# Patient Record
Sex: MALE | Race: WHITE
[De-identification: names, ages, dates, MRNs, and addresses within clinical notes are randomized per-mention and may not be internally consistent; named-entity substitution may affect disease eponyms.]

---

## 2021-03-13 ENCOUNTER — HOSPITAL ENCOUNTER (INPATIENT)
Dept: HOSPITAL 54 - ER | Age: 36
LOS: 2 days | Discharge: HOME | DRG: 53 | End: 2021-03-15
Attending: FAMILY MEDICINE | Admitting: FAMILY MEDICINE
Payer: MEDICAID

## 2021-03-13 VITALS — SYSTOLIC BLOOD PRESSURE: 130 MMHG | DIASTOLIC BLOOD PRESSURE: 56 MMHG

## 2021-03-13 VITALS — SYSTOLIC BLOOD PRESSURE: 102 MMHG | DIASTOLIC BLOOD PRESSURE: 60 MMHG

## 2021-03-13 VITALS — BODY MASS INDEX: 26.98 KG/M2 | WEIGHT: 178 LBS | HEIGHT: 68 IN

## 2021-03-13 DIAGNOSIS — G40.509: Primary | ICD-10-CM

## 2021-03-13 DIAGNOSIS — E80.6: ICD-10-CM

## 2021-03-13 DIAGNOSIS — E87.1: ICD-10-CM

## 2021-03-13 DIAGNOSIS — K59.00: ICD-10-CM

## 2021-03-13 DIAGNOSIS — L40.9: ICD-10-CM

## 2021-03-13 DIAGNOSIS — E83.42: ICD-10-CM

## 2021-03-13 DIAGNOSIS — D53.9: ICD-10-CM

## 2021-03-13 DIAGNOSIS — Z88.5: ICD-10-CM
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
## 2021-03-13 DIAGNOSIS — E83.39: ICD-10-CM

## 2021-03-13 DIAGNOSIS — N39.0: ICD-10-CM

## 2021-03-13 DIAGNOSIS — K76.0: ICD-10-CM

## 2021-03-13 DIAGNOSIS — E87.6: ICD-10-CM

## 2021-03-13 DIAGNOSIS — F10.231: ICD-10-CM

## 2021-03-13 DIAGNOSIS — F10.221: ICD-10-CM

## 2021-03-13 DIAGNOSIS — E44.0: ICD-10-CM

## 2021-03-13 LAB
ALBUMIN SERPL BCP-MCNC: 2.6 G/DL (ref 3.4–5)
ALP SERPL-CCNC: 518 U/L (ref 46–116)
ALT SERPL W P-5'-P-CCNC: 60 U/L (ref 12–78)
APAP SERPL-MCNC: < 2 UG/ML (ref 10–30)
AST SERPL W P-5'-P-CCNC: 292 U/L (ref 15–37)
BASOPHILS # BLD AUTO: 0.1 /CMM (ref 0–0.2)
BASOPHILS NFR BLD AUTO: 0.6 % (ref 0–2)
BILIRUB DIRECT SERPL-MCNC: 12.3 MG/DL (ref 0–0.2)
BILIRUB SERPL-MCNC: 15.2 MG/DL (ref 0.2–1)
BILIRUB UR QL STRIP: (no result)
BUN SERPL-MCNC: 10 MG/DL (ref 7–18)
CALCIUM SERPL-MCNC: 7.9 MG/DL (ref 8.5–10.1)
CHLORIDE SERPL-SCNC: 96 MMOL/L (ref 98–107)
CO2 SERPL-SCNC: 29 MMOL/L (ref 21–32)
COLOR UR: (no result)
CREAT SERPL-MCNC: 0.9 MG/DL (ref 0.6–1.3)
EOSINOPHIL NFR BLD AUTO: 0.4 % (ref 0–6)
ETHANOL SERPL-MCNC: < 3 MG/DL (ref 0–0)
GLUCOSE SERPL-MCNC: 111 MG/DL (ref 74–106)
GLUCOSE UR STRIP-MCNC: (no result) MG/DL
HCT VFR BLD AUTO: 33 % (ref 39–51)
HGB BLD-MCNC: 11.2 G/DL (ref 13.5–17.5)
LYMPHOCYTES NFR BLD AUTO: 1.5 /CMM (ref 0.8–4.8)
LYMPHOCYTES NFR BLD AUTO: 16.9 % (ref 20–44)
LYMPHOCYTES NFR BLD MANUAL: 10 % (ref 16–48)
MCHC RBC AUTO-ENTMCNC: 34 G/DL (ref 31–36)
MCV RBC AUTO: 106 FL (ref 80–96)
MONOCYTES NFR BLD AUTO: 0.9 /CMM (ref 0.1–1.3)
MONOCYTES NFR BLD AUTO: 10.1 % (ref 2–12)
MONOCYTES NFR BLD MANUAL: 10 % (ref 0–11)
NEUTROPHILS # BLD AUTO: 6.5 /CMM (ref 1.8–8.9)
NEUTROPHILS NFR BLD AUTO: 72 % (ref 43–81)
NEUTS BAND NFR BLD MANUAL: 2 % (ref 0–5)
NEUTS SEG NFR BLD MANUAL: 78 % (ref 42–76)
PH UR STRIP: 5.5 [PH] (ref 5–8)
PLATELET # BLD AUTO: 114 /CMM (ref 150–450)
POTASSIUM SERPL-SCNC: 2.6 MMOL/L (ref 3.5–5.1)
PROT SERPL-MCNC: 7.8 G/DL (ref 6.4–8.2)
PROT UR QL STRIP: >=300 MG/DL
RBC # BLD AUTO: 3.12 MIL/UL (ref 4.5–6)
RBC #/AREA URNS HPF: (no result) /HPF (ref 0–2)
SODIUM SERPL-SCNC: 134 MMOL/L (ref 136–145)
UROBILINOGEN UR STRIP-MCNC: 2 EU/DL
WBC #/AREA URNS HPF: (no result) /HPF
WBC #/AREA URNS HPF: (no result) /HPF (ref 0–3)
WBC NRBC COR # BLD AUTO: 9 K/UL (ref 4.3–11)

## 2021-03-13 PROCEDURE — G0378 HOSPITAL OBSERVATION PER HR: HCPCS

## 2021-03-13 PROCEDURE — G0480 DRUG TEST DEF 1-7 CLASSES: HCPCS

## 2021-03-13 RX ADMIN — DEXTROSE MONOHYDRATE SCH MLS/HR: 50 INJECTION, SOLUTION INTRAVENOUS at 15:38

## 2021-03-13 RX ADMIN — POTASSIUM CHLORIDE SCH MLS/HR: 200 INJECTION, SOLUTION INTRAVENOUS at 17:09

## 2021-03-13 RX ADMIN — SODIUM CHLORIDE SCH MLS/HR: 9 INJECTION, SOLUTION INTRAVENOUS at 22:52

## 2021-03-13 RX ADMIN — POTASSIUM CHLORIDE SCH MLS/HR: 200 INJECTION, SOLUTION INTRAVENOUS at 20:47

## 2021-03-13 RX ADMIN — POTASSIUM CHLORIDE SCH MLS/HR: 200 INJECTION, SOLUTION INTRAVENOUS at 22:51

## 2021-03-13 RX ADMIN — POTASSIUM CHLORIDE SCH MLS/HR: 200 INJECTION, SOLUTION INTRAVENOUS at 15:10

## 2021-03-13 RX ADMIN — SODIUM CHLORIDE SCH MLS/HR: 9 INJECTION, SOLUTION INTRAVENOUS at 13:43

## 2021-03-13 RX ADMIN — POTASSIUM CHLORIDE SCH MLS/HR: 200 INJECTION, SOLUTION INTRAVENOUS at 18:10

## 2021-03-13 RX ADMIN — SODIUM CHLORIDE SCH MLS/HR: 9 INJECTION, SOLUTION INTRAVENOUS at 23:00

## 2021-03-13 RX ADMIN — SODIUM CHLORIDE SCH MLS/HR: 9 INJECTION, SOLUTION INTRAVENOUS at 18:57

## 2021-03-13 NOTE — NUR
pt  rec;td to er  c/o etoh  withdrawl. pt  stated had  seizure  3/12/21  hasn't 
 drank since  thurday  has not had a bm in one  week.   eyes and  forehead for 
yellow.  ua sen to lab  darkbrownish. cloudy.  labs  dranwn  sent to lab 
AWAITING EVALUATION BY ER PROVIDER.

## 2021-03-13 NOTE — NUR
PATIENT RECEIVED FROM ER AT 1430. REPORT RECEIVED FROM NURSE. 



PATIENT IN BED RESTING IN SEMI-FOWLERS POSITION. PATIENT ON ROOM AIR WITH NO COMPLAINTS OF 
SOB OR SIGNS OF RESPIRATORY DISTRESS NOTED. RIGHT AC 18 G PATENT AND INTACT. SAFETY 
PRECAUTIONS IMPLEMENTED, BED LOCKED IN LOWEST POSITION, SIDE RAILS UP X2, SEIZURE 
PRECAUTIONS IN PLACE AND SIDE RAILS PADDED, CALL LIGHT WITHIN REACH. WILL ENDORSE 
CONTINUATION OF CARE TO UPCOMING SHIFT.

## 2021-03-13 NOTE — NUR
RN NOTE



RECEIVED PATIENT IN BED, ON SEMI MOSES'S, IN NO S/SX OF ACUTE DISTRESS AT THIS TIME. NO SOB 
NOTED. PATIENT'S BREATHING IS EVEN AND UNLABORED. SATURATION AT 99% ON ROOM AIR, ST ON THE 
MONITOR, HR . NOTED IV SITE AT RIGHT ANTECUBITAL 18 GAUGE, PATENT AND FLUSHING WELL, 
NO S/S OF INFECTION OR INFILTRATION WITH ONGOING K REPLACEMENT AT 50 ML/HR BAG 3 OF 5, AND 
MULTIVITAMINS IN NS 1L  ML/HR. PATIENT IS AMBULATORY WITH ASSIST, URINAL AT BEDSIDE. 
SEIZURE PRECAUTIONS MAINTAINED. SAFETY MEASURES IMPLEMENTED. PATIENT BED ALARM IS ON. HEAD 
OF BED ELEVATED. BED IS LOCKED,  IN LOWEST POSITION AND SIDE RAILS UP. CALL LIGHT WITHIN 
REACH OF THE PATIENT. WILL CONTINUE TO MONITOR AND REASSESS FOR ANY CHANGES.

## 2021-03-14 VITALS — SYSTOLIC BLOOD PRESSURE: 115 MMHG | DIASTOLIC BLOOD PRESSURE: 77 MMHG

## 2021-03-14 VITALS — SYSTOLIC BLOOD PRESSURE: 110 MMHG | DIASTOLIC BLOOD PRESSURE: 64 MMHG

## 2021-03-14 VITALS — SYSTOLIC BLOOD PRESSURE: 103 MMHG | DIASTOLIC BLOOD PRESSURE: 63 MMHG

## 2021-03-14 VITALS — SYSTOLIC BLOOD PRESSURE: 109 MMHG | DIASTOLIC BLOOD PRESSURE: 64 MMHG

## 2021-03-14 VITALS — SYSTOLIC BLOOD PRESSURE: 101 MMHG | DIASTOLIC BLOOD PRESSURE: 35 MMHG

## 2021-03-14 LAB
BASOPHILS # BLD AUTO: 0 /CMM (ref 0–0.2)
BASOPHILS NFR BLD AUTO: 0.6 % (ref 0–2)
BUN SERPL-MCNC: 9 MG/DL (ref 7–18)
CALCIUM SERPL-MCNC: 7.2 MG/DL (ref 8.5–10.1)
CHLORIDE SERPL-SCNC: 102 MMOL/L (ref 98–107)
CHOLEST SERPL-MCNC: 163 MG/DL (ref ?–200)
CO2 SERPL-SCNC: 26 MMOL/L (ref 21–32)
CREAT SERPL-MCNC: 0.6 MG/DL (ref 0.6–1.3)
EOSINOPHIL NFR BLD AUTO: 0.9 % (ref 0–6)
EOSINOPHIL NFR BLD MANUAL: 1 % (ref 0–4)
GLUCOSE SERPL-MCNC: 74 MG/DL (ref 74–106)
HCT VFR BLD AUTO: 29 % (ref 39–51)
HDLC SERPL-MCNC: < 10 MG/DL (ref 40–60)
HGB BLD-MCNC: 9.9 G/DL (ref 13.5–17.5)
LDLC SERPL DIRECT ASSAY-MCNC: 133 MG/DL (ref 0–99)
LYMPHOCYTES NFR BLD AUTO: 2.4 /CMM (ref 0.8–4.8)
LYMPHOCYTES NFR BLD AUTO: 34.7 % (ref 20–44)
LYMPHOCYTES NFR BLD MANUAL: 18 % (ref 16–48)
MAGNESIUM SERPL-MCNC: 1.3 MG/DL (ref 1.8–2.4)
MCHC RBC AUTO-ENTMCNC: 34 G/DL (ref 31–36)
MCV RBC AUTO: 107 FL (ref 80–96)
MONOCYTES NFR BLD AUTO: 0.8 /CMM (ref 0.1–1.3)
MONOCYTES NFR BLD AUTO: 10.9 % (ref 2–12)
MONOCYTES NFR BLD MANUAL: 12 % (ref 0–11)
NEUTROPHILS # BLD AUTO: 3.7 /CMM (ref 1.8–8.9)
NEUTROPHILS NFR BLD AUTO: 52.9 % (ref 43–81)
NEUTS BAND NFR BLD MANUAL: 1 % (ref 0–5)
NEUTS SEG NFR BLD MANUAL: 68 % (ref 42–76)
PHOSPHATE SERPL-MCNC: 1.6 MG/DL (ref 2.5–4.9)
PLATELET # BLD AUTO: 104 /CMM (ref 150–450)
POTASSIUM SERPL-SCNC: 3.4 MMOL/L (ref 3.5–5.1)
RBC # BLD AUTO: 2.69 MIL/UL (ref 4.5–6)
SODIUM SERPL-SCNC: 137 MMOL/L (ref 136–145)
TRIGL SERPL-MCNC: 204 MG/DL (ref 30–150)
WBC NRBC COR # BLD AUTO: 7 K/UL (ref 4.3–11)

## 2021-03-14 RX ADMIN — Medication PRN MG: at 22:34

## 2021-03-14 RX ADMIN — MAGNESIUM SULFATE IN DEXTROSE SCH MLS/HR: 10 INJECTION, SOLUTION INTRAVENOUS at 11:14

## 2021-03-14 RX ADMIN — MAGNESIUM SULFATE IN DEXTROSE SCH MLS/HR: 10 INJECTION, SOLUTION INTRAVENOUS at 13:19

## 2021-03-14 RX ADMIN — MAGNESIUM SULFATE IN DEXTROSE SCH MLS/HR: 10 INJECTION, SOLUTION INTRAVENOUS at 08:58

## 2021-03-14 RX ADMIN — DEXTROSE MONOHYDRATE SCH MLS/HR: 50 INJECTION, SOLUTION INTRAVENOUS at 15:52

## 2021-03-14 RX ADMIN — Medication SCH TAB: at 10:46

## 2021-03-14 RX ADMIN — SODIUM CHLORIDE SCH MLS/HR: 9 INJECTION, SOLUTION INTRAVENOUS at 09:59

## 2021-03-14 RX ADMIN — Medication PRN MG: at 17:15

## 2021-03-14 RX ADMIN — SODIUM CHLORIDE SCH MLS/HR: 9 INJECTION, SOLUTION INTRAVENOUS at 00:06

## 2021-03-14 RX ADMIN — MAGNESIUM SULFATE IN DEXTROSE SCH MLS/HR: 10 INJECTION, SOLUTION INTRAVENOUS at 10:01

## 2021-03-14 NOTE — NUR
RN NOTE



PER EMAR PROTOCOL, ONE BAG OF NS WITH MVI PER DAY, RUN IN PLACE OF NS. CURRENT BAG OF NS 
WITH MVI STARTED AT 1857 BY AM SHIFT. WILL START NS ONCE NS WITH MVI CONSUMED. CHARGE RN 
MADE AWARE.

## 2021-03-14 NOTE — NUR
ms rn note

 patient in bed , alert oriented no sob noted on ra , remove tele monitor will tell md , on 
npo status at this time, bed in lowest and locked position, plan of care dicuissed with 
patint seen by dr powers , rt ac hl intact and flushed well ,on ivf as ordered will 
monitor

## 2021-03-14 NOTE — NUR
MS RN NOTE 

 FEELS A LITTLE BETTER AFTER MYLICON PO GIVEN , CONT ON IVF AS ORDERED BED IN LOWEST AND 
LOCKED POSITION, WILL CONT TO MONITOR

## 2021-03-14 NOTE — NUR
tele rn note

 still c\o abdominal bloating called to dr powers,. gave order simethiconivory jacobn ,order 
carried out

## 2021-03-15 VITALS — SYSTOLIC BLOOD PRESSURE: 119 MMHG | DIASTOLIC BLOOD PRESSURE: 75 MMHG

## 2021-03-15 VITALS — DIASTOLIC BLOOD PRESSURE: 63 MMHG | SYSTOLIC BLOOD PRESSURE: 93 MMHG

## 2021-03-15 LAB
BASOPHILS # BLD AUTO: 0 /CMM (ref 0–0.2)
BASOPHILS NFR BLD AUTO: 0.6 % (ref 0–2)
BUN SERPL-MCNC: 7 MG/DL (ref 7–18)
CALCIUM SERPL-MCNC: 7.1 MG/DL (ref 8.5–10.1)
CHLORIDE SERPL-SCNC: 103 MMOL/L (ref 98–107)
CO2 SERPL-SCNC: 24 MMOL/L (ref 21–32)
CREAT SERPL-MCNC: 0.6 MG/DL (ref 0.6–1.3)
EOSINOPHIL NFR BLD AUTO: 0.7 % (ref 0–6)
GLUCOSE SERPL-MCNC: 102 MG/DL (ref 74–106)
HCT VFR BLD AUTO: 29 % (ref 39–51)
HGB BLD-MCNC: 9.8 G/DL (ref 13.5–17.5)
LYMPHOCYTES NFR BLD AUTO: 2.7 /CMM (ref 0.8–4.8)
LYMPHOCYTES NFR BLD AUTO: 35.9 % (ref 20–44)
LYMPHOCYTES NFR BLD MANUAL: 14 % (ref 16–48)
MAGNESIUM SERPL-MCNC: 1.9 MG/DL (ref 1.8–2.4)
MCHC RBC AUTO-ENTMCNC: 34 G/DL (ref 31–36)
MCV RBC AUTO: 107 FL (ref 80–96)
MONOCYTES NFR BLD AUTO: 1 /CMM (ref 0.1–1.3)
MONOCYTES NFR BLD AUTO: 13.2 % (ref 2–12)
MONOCYTES NFR BLD MANUAL: 8 % (ref 0–11)
NEUTROPHILS # BLD AUTO: 3.7 /CMM (ref 1.8–8.9)
NEUTROPHILS NFR BLD AUTO: 49.6 % (ref 43–81)
NEUTS BAND NFR BLD MANUAL: 4 % (ref 0–5)
NEUTS SEG NFR BLD MANUAL: 74 % (ref 42–76)
PHOSPHATE SERPL-MCNC: 1.8 MG/DL (ref 2.5–4.9)
PLATELET # BLD AUTO: 139 /CMM (ref 150–450)
POTASSIUM SERPL-SCNC: 3.3 MMOL/L (ref 3.5–5.1)
RBC # BLD AUTO: 2.67 MIL/UL (ref 4.5–6)
SODIUM SERPL-SCNC: 135 MMOL/L (ref 136–145)
WBC NRBC COR # BLD AUTO: 7.5 K/UL (ref 4.3–11)

## 2021-03-15 RX ADMIN — DEXTROSE MONOHYDRATE SCH MLS/HR: 50 INJECTION, SOLUTION INTRAVENOUS at 14:00

## 2021-03-15 RX ADMIN — SODIUM CHLORIDE SCH MLS/HR: 9 INJECTION, SOLUTION INTRAVENOUS at 12:09

## 2021-03-15 RX ADMIN — Medication PRN MG: at 12:17

## 2021-03-15 RX ADMIN — Medication SCH TAB: at 08:21

## 2021-03-15 RX ADMIN — SODIUM CHLORIDE SCH MLS/HR: 9 INJECTION, SOLUTION INTRAVENOUS at 09:19

## 2021-03-15 RX ADMIN — SODIUM CHLORIDE SCH MLS/HR: 9 INJECTION, SOLUTION INTRAVENOUS at 01:39

## 2021-03-15 NOTE — NUR
MS RN AM NOTE



RECEIVED PATIENT IN BED, ASLEEP, RESPONDS RIGHT AWAY TO NAME AN TOUCH, AOX 4, ON ROOM AIR, 
NO S/SX OF ACUTE DISTRESS AT THIS TIME. NO SOB NOTED. PATIENT'S BREATHING IS EVEN AND 
UNLABORED. SATURATION % ON ROOM AIR, RT AC G 18 IV SITE FLUSHES WELL, SITE CLEAR, 
REFUSED IVF, AMBULATORY, REGULAR DIET, SEIZURE PRECAUTIONS MAINTAINED. SAFETY MEASURES 
IMPLEMENTED. PATIENT BED ALARM IS ON. HEAD OF BED ELEVATED. BED IS LOCKED,  IN LOWEST 
POSITION AND SIDE RAILS UP. CALL LIGHT WITHIN REACH OF THE PATIENT. POC DISCUSSED, 
VERBALIZED UNDERSTANDING. WILL CONTINUE TO MONITOR.

## 2021-03-15 NOTE — NUR
RN DISCHARGE NOTES



PATIENT DISCHARGED TO HOME TODAY PER MD IN STABLE CONDITION. PROVIDED DC INSTRUCTIONS, 
HEALTH TEACHINGS AND MED RECON LIST. PATIENT TO FOLLOWUP WITH PCP IN 1-2 WEEKS AND WILL MAKE 
OWN APPOINTMENT. REFUSED TO TAKE PHOTOS OF SKIN ISSUES. IV ACCESS TO LEFT AC, CATH TIP 
COMPLETE, NO BLEEDING, DRESSING IN PLACE. ALL BELONGINGS CHECKED AND RETURNED. ALL PAPERS 
SIGNED. AMBUALTED AND ACCOMPANIED BY [facility] TO LOBBY AND WILL TAKE UBER TO GO HOME.

## 2021-03-21 ENCOUNTER — HOSPITAL ENCOUNTER (INPATIENT)
Dept: HOSPITAL 54 - ER | Age: 36
LOS: 3 days | Discharge: LEFT BEFORE DISCHARGE | DRG: 280 | End: 2021-03-24
Attending: INTERNAL MEDICINE | Admitting: INTERNAL MEDICINE
Payer: MEDICAID

## 2021-03-21 VITALS — DIASTOLIC BLOOD PRESSURE: 69 MMHG | SYSTOLIC BLOOD PRESSURE: 108 MMHG

## 2021-03-21 VITALS — WEIGHT: 175 LBS | HEIGHT: 68 IN | BODY MASS INDEX: 26.52 KG/M2

## 2021-03-21 VITALS — SYSTOLIC BLOOD PRESSURE: 116 MMHG | DIASTOLIC BLOOD PRESSURE: 70 MMHG

## 2021-03-21 DIAGNOSIS — K76.7: ICD-10-CM

## 2021-03-21 DIAGNOSIS — K72.00: ICD-10-CM

## 2021-03-21 DIAGNOSIS — K76.6: ICD-10-CM

## 2021-03-21 DIAGNOSIS — K70.31: Primary | ICD-10-CM

## 2021-03-21 DIAGNOSIS — F10.288: ICD-10-CM

## 2021-03-21 DIAGNOSIS — L40.9: ICD-10-CM

## 2021-03-21 DIAGNOSIS — R74.01: ICD-10-CM

## 2021-03-21 DIAGNOSIS — Z20.822: ICD-10-CM

## 2021-03-21 DIAGNOSIS — F10.239: ICD-10-CM
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
## 2021-03-21 DIAGNOSIS — E87.1: ICD-10-CM

## 2021-03-21 DIAGNOSIS — N17.0: ICD-10-CM

## 2021-03-21 DIAGNOSIS — K70.11: ICD-10-CM

## 2021-03-21 LAB
ALBUMIN SERPL BCP-MCNC: 2.1 G/DL (ref 3.4–5)
ALP SERPL-CCNC: 415 U/L (ref 46–116)
ALT SERPL W P-5'-P-CCNC: 60 U/L (ref 12–78)
AMMONIA PLAS-SCNC: 25 UMOL/L (ref 11–32)
AST SERPL W P-5'-P-CCNC: 257 U/L (ref 15–37)
BASOPHILS # BLD AUTO: 0.2 /CMM (ref 0–0.2)
BASOPHILS NFR BLD AUTO: 1.6 % (ref 0–2)
BILIRUB DIRECT SERPL-MCNC: 22.8 MG/DL (ref 0–0.2)
BILIRUB SERPL-MCNC: 26.8 MG/DL (ref 0.2–1)
BUN SERPL-MCNC: 14 MG/DL (ref 7–18)
CALCIUM SERPL-MCNC: 8.6 MG/DL (ref 8.5–10.1)
CHLORIDE SERPL-SCNC: 93 MMOL/L (ref 98–107)
CO2 SERPL-SCNC: 21 MMOL/L (ref 21–32)
CREAT SERPL-MCNC: 1.2 MG/DL (ref 0.6–1.3)
EOSINOPHIL NFR BLD AUTO: 0.6 % (ref 0–6)
ETHANOL SERPL-MCNC: < 3 MG/DL (ref 0–0)
GLUCOSE SERPL-MCNC: 111 MG/DL (ref 74–106)
HCT VFR BLD AUTO: 35 % (ref 39–51)
HGB BLD-MCNC: 11.9 G/DL (ref 13.5–17.5)
LIPASE SERPL-CCNC: 136 U/L (ref 73–393)
LYMPHOCYTES NFR BLD AUTO: 29.5 % (ref 20–44)
LYMPHOCYTES NFR BLD AUTO: 4.3 /CMM (ref 0.8–4.8)
LYMPHOCYTES NFR BLD MANUAL: 4 % (ref 16–48)
MCHC RBC AUTO-ENTMCNC: 34 G/DL (ref 31–36)
MCV RBC AUTO: 107 FL (ref 80–96)
MONOCYTES NFR BLD AUTO: 1.4 /CMM (ref 0.1–1.3)
MONOCYTES NFR BLD AUTO: 9.3 % (ref 2–12)
MONOCYTES NFR BLD MANUAL: 9 % (ref 0–11)
NEUTROPHILS # BLD AUTO: 8.6 /CMM (ref 1.8–8.9)
NEUTROPHILS NFR BLD AUTO: 59 % (ref 43–81)
NEUTS BAND NFR BLD MANUAL: 2 % (ref 0–5)
NEUTS SEG NFR BLD MANUAL: 85 % (ref 42–76)
PLATELET # BLD AUTO: 300 /CMM (ref 150–450)
POTASSIUM SERPL-SCNC: 3.7 MMOL/L (ref 3.5–5.1)
PROT SERPL-MCNC: 7 G/DL (ref 6.4–8.2)
RBC # BLD AUTO: 3.29 MIL/UL (ref 4.5–6)
SODIUM SERPL-SCNC: 126 MMOL/L (ref 136–145)
WBC NRBC COR # BLD AUTO: 14.7 K/UL (ref 4.3–11)

## 2021-03-21 PROCEDURE — G0480 DRUG TEST DEF 1-7 CLASSES: HCPCS

## 2021-03-21 PROCEDURE — P9047 ALBUMIN (HUMAN), 25%, 50ML: HCPCS

## 2021-03-21 PROCEDURE — A4216 STERILE WATER/SALINE, 10 ML: HCPCS

## 2021-03-21 PROCEDURE — C9803 HOPD COVID-19 SPEC COLLECT: HCPCS

## 2021-03-21 PROCEDURE — G0378 HOSPITAL OBSERVATION PER HR: HCPCS

## 2021-03-21 RX ADMIN — Medication PRN MG: at 20:35

## 2021-03-21 RX ADMIN — CEFEPIME HYDROCHLORIDE SCH MLS/HR: 1 INJECTION, POWDER, FOR SOLUTION INTRAMUSCULAR; INTRAVENOUS at 23:18

## 2021-03-21 NOTE — NUR
BIB by self c/o abdominal pain and distention x 4 days. Presents with 
generalized jaundice. IV line in the LAC#18g, labs drawn and sent to lab. Vital 
signs stable, warm blanket provided, patient kept comfortable, will continue to 
monitor.

## 2021-03-21 NOTE — NUR
MS/RN NOTE



ON CALL MD SYKES CONTACT FOR PAIN MEDICATION. NEW ORDER FOR MORPHINE 4MG IVP Q6HRS PRN FOR 
SEVERE PAIN. ORDER INPUTTED AND CARRIED OUT.

## 2021-03-21 NOTE — NUR
RN ADMITTING NOTE 



Report received from Haile HEBERT. Patient arrived around 1600. A/oX4, independent, ambulatory, 
skin assessed and presents with generalized jaundice, present in both eyes as well. Patient 
has history of psoriasis that started two years ago, presents with generalized psoriasis, 
photos taken of the upper body, patient refuses to have lower body assessed. Patient refuses 
to wear gown. Vital signs stable, tele monitor SR 90s. Patient states that his allergy to 
codeine happened when he was a child and he stated he had hives. Patient states no recent 
occurrence of allergic reaction. 

Safety measures in place, call light within reach, will continue with plan of care.

## 2021-03-21 NOTE — NUR
MS/RN OPENING NOTE



RECEIVED PATIENT RESTING IN BED WATCHING TV. AWAKE, ALERT AND ORIENTED X 4. ABLE TO MAKE 
NEEDS KNOWN. COMPLAINTS OF PAIN TO ABDOMEN - WILL CHECK EMAR FOR PAIN MEDICATION. IV ACCESS 
TO LEFT AC INTACT AND PATENT. ABDOMEN DISTENDED, FIRM AND TENDER TO TOUCH. CONTINUES ON ROOM 
AIR WITH NO SIGNS OR SYMPTOMS OF RESPIRATORY DISTRESS NOTED. CALL LIGHT WITHIN REACH. 
ASPIRATION, FALL AND SAFETY PRECAUTIONS MAINTAINED. WILL CONTINUE TO MONITOR.

## 2021-03-21 NOTE — NUR
RN CLOSING NOTE 



Patient is resting in bed, A/Ox4, in no acute distress, stable on room air. Patient denies 
any pain or discomfort at this time. Safety precautions in place, call light is within 
reach, will endorse to night shift for ARNOLD.

## 2021-03-22 VITALS — DIASTOLIC BLOOD PRESSURE: 64 MMHG | SYSTOLIC BLOOD PRESSURE: 103 MMHG

## 2021-03-22 VITALS — DIASTOLIC BLOOD PRESSURE: 55 MMHG | SYSTOLIC BLOOD PRESSURE: 99 MMHG

## 2021-03-22 VITALS — SYSTOLIC BLOOD PRESSURE: 98 MMHG | DIASTOLIC BLOOD PRESSURE: 51 MMHG

## 2021-03-22 VITALS — SYSTOLIC BLOOD PRESSURE: 108 MMHG | DIASTOLIC BLOOD PRESSURE: 70 MMHG

## 2021-03-22 VITALS — DIASTOLIC BLOOD PRESSURE: 57 MMHG | SYSTOLIC BLOOD PRESSURE: 110 MMHG

## 2021-03-22 LAB
ALBUMIN SERPL BCP-MCNC: 1.7 G/DL (ref 3.4–5)
ALP SERPL-CCNC: 351 U/L (ref 46–116)
ALT SERPL W P-5'-P-CCNC: 49 U/L (ref 12–78)
AST SERPL W P-5'-P-CCNC: 218 U/L (ref 15–37)
BASOPHILS # BLD AUTO: 0.1 /CMM (ref 0–0.2)
BASOPHILS NFR BLD AUTO: 0.6 % (ref 0–2)
BILIRUB SERPL-MCNC: 23.9 MG/DL (ref 0.2–1)
BUN SERPL-MCNC: 22 MG/DL (ref 7–18)
CALCIUM SERPL-MCNC: 8.3 MG/DL (ref 8.5–10.1)
CHLORIDE SERPL-SCNC: 96 MMOL/L (ref 98–107)
CHOLEST SERPL-MCNC: 121 MG/DL (ref ?–200)
CO2 SERPL-SCNC: 22 MMOL/L (ref 21–32)
CREAT SERPL-MCNC: 1.6 MG/DL (ref 0.6–1.3)
EOSINOPHIL NFR BLD AUTO: 0.9 % (ref 0–6)
GLUCOSE SERPL-MCNC: 96 MG/DL (ref 74–106)
HCT VFR BLD AUTO: 32 % (ref 39–51)
HDLC SERPL-MCNC: < 10 MG/DL (ref 40–60)
HGB BLD-MCNC: 10.8 G/DL (ref 13.5–17.5)
LDLC SERPL DIRECT ASSAY-MCNC: 104 MG/DL (ref 0–99)
LIPASE SERPL-CCNC: 159 U/L (ref 73–393)
LYMPHOCYTES NFR BLD AUTO: 24.3 % (ref 20–44)
LYMPHOCYTES NFR BLD AUTO: 3.5 /CMM (ref 0.8–4.8)
LYMPHOCYTES NFR BLD MANUAL: 6 % (ref 16–48)
MAGNESIUM SERPL-MCNC: 2.1 MG/DL (ref 1.8–2.4)
MCHC RBC AUTO-ENTMCNC: 34 G/DL (ref 31–36)
MCV RBC AUTO: 107 FL (ref 80–96)
MONOCYTES NFR BLD AUTO: 1.2 /CMM (ref 0.1–1.3)
MONOCYTES NFR BLD AUTO: 8.5 % (ref 2–12)
MONOCYTES NFR BLD MANUAL: 8 % (ref 0–11)
NEUTROPHILS # BLD AUTO: 9.5 /CMM (ref 1.8–8.9)
NEUTROPHILS NFR BLD AUTO: 65.7 % (ref 43–81)
NEUTS BAND NFR BLD MANUAL: 4 % (ref 0–5)
NEUTS SEG NFR BLD MANUAL: 82 % (ref 42–76)
PHOSPHATE SERPL-MCNC: 4.1 MG/DL (ref 2.5–4.9)
PLATELET # BLD AUTO: 246 /CMM (ref 150–450)
POTASSIUM SERPL-SCNC: 4.1 MMOL/L (ref 3.5–5.1)
PROT SERPL-MCNC: 6.2 G/DL (ref 6.4–8.2)
RBC # BLD AUTO: 2.98 MIL/UL (ref 4.5–6)
SODIUM SERPL-SCNC: 128 MMOL/L (ref 136–145)
TRIGL SERPL-MCNC: 222 MG/DL (ref 30–150)
WBC NRBC COR # BLD AUTO: 14.4 K/UL (ref 4.3–11)

## 2021-03-22 PROCEDURE — 0W9G3ZZ DRAINAGE OF PERITONEAL CAVITY, PERCUTANEOUS APPROACH: ICD-10-PCS

## 2021-03-22 RX ADMIN — Medication SCH MLS/HR: at 13:49

## 2021-03-22 RX ADMIN — Medication SCH MLS/HR: at 21:17

## 2021-03-22 RX ADMIN — Medication PRN MG: at 13:49

## 2021-03-22 RX ADMIN — CEFEPIME HYDROCHLORIDE SCH MLS/HR: 1 INJECTION, POWDER, FOR SOLUTION INTRAMUSCULAR; INTRAVENOUS at 08:40

## 2021-03-22 RX ADMIN — CEFEPIME HYDROCHLORIDE SCH MLS/HR: 1 INJECTION, POWDER, FOR SOLUTION INTRAMUSCULAR; INTRAVENOUS at 16:49

## 2021-03-22 RX ADMIN — Medication PRN MG: at 08:38

## 2021-03-22 RX ADMIN — Medication PRN MG: at 21:17

## 2021-03-22 RX ADMIN — Medication PRN MG: at 02:42

## 2021-03-22 NOTE — NUR
RN CLOSING NOTE



PT IS AWAKE IN BED RESTING. A/O X3 AND ENGLISH SPEAKING. NO COMPLAINT OF PAIN. NO COMPLAINT 
OF NAUSEA.CURRENTLY ON RA WITH NO SOB OR RESPIRATORY DISTRESS PRESENT. O2 SAT >95%. 
AMBULATORY WITH BATHROOM PRIVILEGES. SKIN IS JAUNDICED WITH PSORIASIS. NO EDEMA PRESENT. HL 
PRESENT ON L FA 22G. ROUTINE MEDS GIVEN. SAFETY MEASURES IN PLACE. SIDE RAILS RAISED. BED 
LOWERED. CALL LIGHT WITHIN REACH. REPORT TO BE GIVEN TO NIGHT NURSE FOR ARNOLD.

## 2021-03-22 NOTE — NUR
MS RN NOTES - PAIN



PATIENT C/O 8/10 ABDOMINAL PAIN. ADMINISTERED MORPHINE AS ORDERED. WILL CONTINUE TO ASSESS 
FOR PAIN.

## 2021-03-22 NOTE — NUR
RN OPENING NOTE



PT IS AWAKE IN BED RESTING. A/O X3 AND ENGLISH SPEAKING. COMPLAINT OF ABDOMINAL PAIN 8/10. 
PAIN MEDS GIVEN. NO COMPLAINT OF NAUSEA.CURRENTLY ON RA WITH NO SOB OR RESPIRATORY DISTRESS 
PRESENT. O2 SAT >95%. AMBULATORY WITH BATHROOM PRIVILEGES. SKIN IS JAUNDICED WITH PSORIASIS. 
NO EDEMA PRESENT. HL PRESENT ON L AC 18G. SAFETY MEASURES IN PLACE. SIDE RAILS RAISED. BED 
LOWERED. CALL LIGHT WITHIN REACH. WILL CONTINUE TO MONITOR. 98.3

## 2021-03-22 NOTE — NUR
MS/RN CLOSING NOTE



PATIENT IS CURRENTLY SLEEPING IN BED. ALERT AND ORIENTED X 4. ABLE TO MAKE NEEDS KNOWN. NO 
COMPLAINTS OF PAIN AT THIS TIME. IV ACCESS TO LEFT AC INTACT AND PATENT. CONTINUES ON IV 
CEFEPIME. TELE MONITOR READING SR 85. NO SIGNS OR SYMPTOMS OF RESPIRATORY DISTRESS NOTED. 
CALL LIGHT WITHIN REACH. ASPIRATION, FALL AND SAFETY PRECAUTIONS MAINTAINED. WILL ENDORSE 
PLAN OF CARE TO ONCOMING SHIFT RN.

## 2021-03-22 NOTE — NUR
MS RN OPENING NOTE



PATIENT SITTING IN BED A/OX4; ABLE TO MAKE NEEDS KNOWN. ON ROOM AIR; TOLERATING WELL WITH NO 
SOB. IV TO LEFT WRIST #22G; PATENT AND INTACT. DENIES ANY PAIN OR DISCOMFORT AT THIS TIME 
SAFETY MEASURES IN PLACE: BED IN LOWEST LOCKED POSITION, CALL LIGHT WITHIN EASY REACH, SIDE 
RAILS UP X2, BED ALARMS ON. PATIENT MEDICALLY STABLE AT THIS TIME.

## 2021-03-23 VITALS — DIASTOLIC BLOOD PRESSURE: 53 MMHG | SYSTOLIC BLOOD PRESSURE: 93 MMHG

## 2021-03-23 VITALS — SYSTOLIC BLOOD PRESSURE: 96 MMHG | DIASTOLIC BLOOD PRESSURE: 49 MMHG

## 2021-03-23 VITALS — DIASTOLIC BLOOD PRESSURE: 54 MMHG | SYSTOLIC BLOOD PRESSURE: 101 MMHG

## 2021-03-23 VITALS — SYSTOLIC BLOOD PRESSURE: 93 MMHG | DIASTOLIC BLOOD PRESSURE: 53 MMHG

## 2021-03-23 LAB
ALBUMIN SERPL BCP-MCNC: 2.2 G/DL (ref 3.4–5)
ALP SERPL-CCNC: 312 U/L (ref 46–116)
ALT SERPL W P-5'-P-CCNC: 42 U/L (ref 12–78)
AST SERPL W P-5'-P-CCNC: 199 U/L (ref 15–37)
BASOPHILS # BLD AUTO: 0.1 /CMM (ref 0–0.2)
BASOPHILS NFR BLD AUTO: 1 % (ref 0–2)
BILIRUB SERPL-MCNC: 24.8 MG/DL (ref 0.2–1)
BUN SERPL-MCNC: 22 MG/DL (ref 7–18)
CALCIUM SERPL-MCNC: 8 MG/DL (ref 8.5–10.1)
CHLORIDE SERPL-SCNC: 97 MMOL/L (ref 98–107)
CK SERPL-CCNC: 22 U/L (ref 39–308)
CO2 SERPL-SCNC: 22 MMOL/L (ref 21–32)
CREAT SERPL-MCNC: 1.6 MG/DL (ref 0.6–1.3)
EOSINOPHIL NFR BLD AUTO: 0.5 % (ref 0–6)
EOSINOPHIL NFR BLD MANUAL: 1 % (ref 0–4)
GLUCOSE SERPL-MCNC: 137 MG/DL (ref 74–106)
HCT VFR BLD AUTO: 30 % (ref 39–51)
HGB BLD-MCNC: 10.5 G/DL (ref 13.5–17.5)
LYMPHOCYTES NFR BLD AUTO: 2.9 /CMM (ref 0.8–4.8)
LYMPHOCYTES NFR BLD AUTO: 20.4 % (ref 20–44)
LYMPHOCYTES NFR BLD MANUAL: 17 % (ref 16–48)
MAGNESIUM SERPL-MCNC: 2 MG/DL (ref 1.8–2.4)
MCHC RBC AUTO-ENTMCNC: 35 G/DL (ref 31–36)
MCV RBC AUTO: 106 FL (ref 80–96)
MONOCYTES NFR BLD AUTO: 1.3 /CMM (ref 0.1–1.3)
MONOCYTES NFR BLD AUTO: 9.4 % (ref 2–12)
MONOCYTES NFR BLD MANUAL: 7 % (ref 0–11)
NEUTROPHILS # BLD AUTO: 9.6 /CMM (ref 1.8–8.9)
NEUTROPHILS NFR BLD AUTO: 68.7 % (ref 43–81)
NEUTS BAND NFR BLD MANUAL: 8 % (ref 0–5)
NEUTS SEG NFR BLD MANUAL: 67 % (ref 42–76)
PHOSPHATE SERPL-MCNC: 2.8 MG/DL (ref 2.5–4.9)
PLATELET # BLD AUTO: 222 /CMM (ref 150–450)
POTASSIUM SERPL-SCNC: 3.4 MMOL/L (ref 3.5–5.1)
PROT SERPL-MCNC: 6.2 G/DL (ref 6.4–8.2)
RBC # BLD AUTO: 2.86 MIL/UL (ref 4.5–6)
SODIUM SERPL-SCNC: 130 MMOL/L (ref 136–145)
WBC NRBC COR # BLD AUTO: 14 K/UL (ref 4.3–11)

## 2021-03-23 RX ADMIN — Medication PRN MG: at 09:17

## 2021-03-23 RX ADMIN — CEFEPIME HYDROCHLORIDE SCH MLS/HR: 1 INJECTION, POWDER, FOR SOLUTION INTRAMUSCULAR; INTRAVENOUS at 08:45

## 2021-03-23 RX ADMIN — Medication PRN MG: at 20:39

## 2021-03-23 RX ADMIN — CEFEPIME HYDROCHLORIDE SCH MLS/HR: 1 INJECTION, POWDER, FOR SOLUTION INTRAMUSCULAR; INTRAVENOUS at 00:22

## 2021-03-23 RX ADMIN — Medication PRN MG: at 01:37

## 2021-03-23 RX ADMIN — CEFEPIME HYDROCHLORIDE SCH MLS/HR: 1 INJECTION, POWDER, FOR SOLUTION INTRAMUSCULAR; INTRAVENOUS at 23:43

## 2021-03-23 RX ADMIN — Medication SCH MLS/HR: at 05:33

## 2021-03-23 RX ADMIN — CEFEPIME HYDROCHLORIDE SCH MLS/HR: 1 INJECTION, POWDER, FOR SOLUTION INTRAMUSCULAR; INTRAVENOUS at 15:19

## 2021-03-23 NOTE — NUR
RN NOTES 



CLARIFIED VIT K. order with Dr. Berrios per MD deleon to give. MD aware patient was given 
vitamin k yesterday. Current INR 2.4

## 2021-03-23 NOTE — NUR
MS RN CLOSING NOTE



PATIENT SITTING IN BED A/OX4; ABLE TO MAKE NEEDS KNOWN. ON ROOM AIR; TOLERATING WELL WITH NO 
SOB. IV TO LEFT WRIST #22G; INFUSING ALBUMIN 100ML/HR; PATENT AND INTACT. DENIES ANY PAIN OR 
DISCOMFORT AT THIS TIME SAFETY MEASURES IN PLACE: BED IN LOWEST LOCKED POSITION, CALL LIGHT 
WITHIN EASY REACH, SIDE RAILS UP X2, BED ALARMS ON. PATIENT MEDICALLY STABLE AT THIS TIME. 
WILL ENDORSE PLAN OF CARE TO ONCOMING MORNING RN

## 2021-03-23 NOTE — NUR
MS RN: CONTINUITY OF CARE

Patient in bed, awake, A/O x4. Patient voicing to go home today, states he supposed to have 
paracentesis today and it did not happen. Abdomen rounded and distended. Discussed plan of 
care to patient, continue hospitalization and risk of going home AMA. Patient verbalized 
understanding and agreed with plan of care.

## 2021-03-23 NOTE — NUR
MS RN OPENING NOTE



PATIENT SITTING IN BED A/OX4; ABLE TO MAKE NEEDS KNOWN. ON ROOM AIR; TOLERATING WELL WITH NO 
SOB. IV TO LEFT WRIST #22G; PATENT AND INTACT. DENIES ANY PAIN OR DISCOMFORT AT THIS TIME 
SAFETY MEASURES IN PLACE: BED IN LOWEST LOCKED POSITION, CALL LIGHT WITHIN EASY REACH, SIDE 
RAILS UP X2, BED ALARMS ON.

## 2021-03-23 NOTE — NUR
MS RN CLOSING  NOTE



PATIENT SITTING IN BED A/OX4; ABLE TO MAKE NEEDS KNOWN. ON ROOM AIR; TOLERATING WELL WITH NO 
SOB. IV TO LEFT WRIST #22G; PATENT AND INTACT. DENIES ANY PAIN OR DISCOMFORT AT THIS TIME 
SAFETY MEASURES IN PLACE: BED IN LOWEST LOCKED POSITION, CALL LIGHT WITHIN EASY REACH, SIDE 
RAILS UP X2, BED ALARMS ON.

## 2021-03-23 NOTE — NUR
MS RN: ABDOMINAL PAIN 

C/o abdominal pain 9/10 described as sharp and aching. Denies N/V. Morphine given, will 
reassess.

## 2021-03-24 VITALS — DIASTOLIC BLOOD PRESSURE: 54 MMHG | SYSTOLIC BLOOD PRESSURE: 103 MMHG

## 2021-03-24 LAB
ALBUMIN SERPL BCP-MCNC: 2 G/DL (ref 3.4–5)
ALP SERPL-CCNC: 314 U/L (ref 46–116)
ALT SERPL W P-5'-P-CCNC: 40 U/L (ref 12–78)
AST SERPL W P-5'-P-CCNC: 214 U/L (ref 15–37)
BASOPHILS # BLD AUTO: 0.1 /CMM (ref 0–0.2)
BASOPHILS NFR BLD AUTO: 0.6 % (ref 0–2)
BILIRUB DIRECT SERPL-MCNC: 19.3 MG/DL (ref 0–0.2)
BILIRUB SERPL-MCNC: 23.7 MG/DL (ref 0.2–1)
BUN SERPL-MCNC: 21 MG/DL (ref 7–18)
CALCIUM SERPL-MCNC: 8 MG/DL (ref 8.5–10.1)
CHLORIDE SERPL-SCNC: 100 MMOL/L (ref 98–107)
CO2 SERPL-SCNC: 19 MMOL/L (ref 21–32)
CREAT SERPL-MCNC: 1.1 MG/DL (ref 0.6–1.3)
EOSINOPHIL NFR BLD AUTO: 0.7 % (ref 0–6)
EOSINOPHIL NFR BLD MANUAL: 1 % (ref 0–4)
GLUCOSE SERPL-MCNC: 102 MG/DL (ref 74–106)
HCT VFR BLD AUTO: 31 % (ref 39–51)
HGB BLD-MCNC: 10.5 G/DL (ref 13.5–17.5)
LYMPHOCYTES NFR BLD AUTO: 19.5 % (ref 20–44)
LYMPHOCYTES NFR BLD AUTO: 3 /CMM (ref 0.8–4.8)
LYMPHOCYTES NFR BLD MANUAL: 6 % (ref 16–48)
MAGNESIUM SERPL-MCNC: 2 MG/DL (ref 1.8–2.4)
MCHC RBC AUTO-ENTMCNC: 34 G/DL (ref 31–36)
MCV RBC AUTO: 106 FL (ref 80–96)
MONOCYTES NFR BLD AUTO: 1.4 /CMM (ref 0.1–1.3)
MONOCYTES NFR BLD AUTO: 9 % (ref 2–12)
MONOCYTES NFR BLD MANUAL: 8 % (ref 0–11)
NEUTROPHILS # BLD AUTO: 10.7 /CMM (ref 1.8–8.9)
NEUTROPHILS NFR BLD AUTO: 70.2 % (ref 43–81)
NEUTS BAND NFR BLD MANUAL: 8 % (ref 0–5)
NEUTS SEG NFR BLD MANUAL: 77 % (ref 42–76)
PHOSPHATE SERPL-MCNC: 2.1 MG/DL (ref 2.5–4.9)
PLATELET # BLD AUTO: 213 /CMM (ref 150–450)
POTASSIUM SERPL-SCNC: 3.6 MMOL/L (ref 3.5–5.1)
PROT SERPL-MCNC: 5.8 G/DL (ref 6.4–8.2)
RBC # BLD AUTO: 2.9 MIL/UL (ref 4.5–6)
SODIUM SERPL-SCNC: 130 MMOL/L (ref 136–145)
WBC NRBC COR # BLD AUTO: 15.2 K/UL (ref 4.3–11)

## 2021-03-24 RX ADMIN — Medication PRN MG: at 01:08

## 2021-03-24 RX ADMIN — CEFEPIME HYDROCHLORIDE SCH MLS/HR: 1 INJECTION, POWDER, FOR SOLUTION INTRAMUSCULAR; INTRAVENOUS at 08:44

## 2021-03-24 NOTE — NUR
RN MS NOTES



PATIENT AWAKE , WALKING IN ROOM, NO C/O PAIN , RESPIRATIONS EVEN AND UNLABORED , SEEN BY 
 PLAN OF CARE DISCUSSED WITH PATIENT , PATIENT VERBALIZES WANTING TO LEAVE NOW 
AND CANNOT WAIT FOR ANYMORE CONSULTATIONS, INFORMED PT OF RISKS OF LEAVING AMA , PT STILL 
INSISTED ON LEAVING DR STATON INFORMED OF PATIENT LEAVING AMA, PT EDUCATED ON FOLLOW UP 
WITH PCP AND TO RETURN TO ER IN CASE OF EMERGENCY PATIENT REFUSED SKIN/BODY CHECK , 
BELONGINGS ACCOUNTED FOR , STATED HE IS ARRANGED FOR AN UBER, ACCOMPANIED TO HOSPITAL LOBBY

## 2021-03-24 NOTE — NUR
notes:  This SW attempted to meet with patient today, but was told by ANUP Gallo that patient left AMA.  SW unable to complete assessment. no further SS interventions 
needed at this time.

## 2021-03-24 NOTE — NUR
MS RN: END OF SHIFT REPORT

Stable Oxygen saturation on room air. Abdomen rounded and distended, skin jaundice, 
abdominal pain controlled with Morphine. On IV Maxipime, afebrile. SBP in the low 90's no 
c/o headache, independent with ADL and mobility. Plan for cont ABX. Will endorse to oncoming 
RN.

## 2021-03-24 NOTE — NUR
MS RN: ABDOMINAL PAIN 

C/o abdominal pain 9/10 described as sharp and aching. Denies N/V. Morphine given, will 
reassess pain level.

## 2021-03-24 NOTE — NUR
MS RN OPENING NOTES



PATIENT ASLEEP IN BED, Stable Oxygen saturation on room air. Abdomen rounded and distended, 
skin jaundice, abdominal pain controlled with Morphine. On IV Maxipime, afebrile. SBP in the 
low 90's no c/o headache, independent with ADL and mobility. Plan for cont ABX. Will endorse 
to oncoming RN. 

-------------------------------------------------------------------------------

Addendum: 03/24/21 at 0916 by RAÚL PHILLIP RN

-------------------------------------------------------------------------------

PT DISCHARGE PLAN IN PROCESS

## 2021-03-24 NOTE — NUR
RN MS NOTES

PT AWAKE, WALKING IN HIS ROOM, STATED THAT HE WANTS TO LEAVE THE HOSPITAL AND CANNOT ANYMORE 
WAIT FOR ANY CONSULTS, DR. SHEMAR MARIE MD SAID PT NOT CLEARED FOR DISCHARGE.  PT 
WILL LEAVE AMA.